# Patient Record
Sex: MALE | Race: WHITE | NOT HISPANIC OR LATINO | Employment: FULL TIME | RURAL
[De-identification: names, ages, dates, MRNs, and addresses within clinical notes are randomized per-mention and may not be internally consistent; named-entity substitution may affect disease eponyms.]

---

## 2024-02-09 ENCOUNTER — LAB VISIT (OUTPATIENT)
Dept: PRIMARY CARE CLINIC | Facility: CLINIC | Age: 44
End: 2024-02-09

## 2024-02-09 DIAGNOSIS — Z02.83 ENCOUNTER FOR DRUG SCREENING: Primary | ICD-10-CM

## 2024-02-09 PROCEDURE — 99000 SPECIMEN HANDLING OFFICE-LAB: CPT | Mod: ,,, | Performed by: NURSE PRACTITIONER

## 2024-02-09 NOTE — PROGRESS NOTES
Subjective     Patient ID: Jose Eduardo Barrientos is a 43 y.o. male.    Chief Complaint: No chief complaint on file.    HPI  Review of Systems       Objective     Physical Exam       Assessment and Plan     1. Encounter for drug screening        Drug testing only           No follow-ups on file.

## 2025-04-08 ENCOUNTER — HOSPITAL ENCOUNTER (EMERGENCY)
Facility: HOSPITAL | Age: 45
Discharge: HOME | End: 2025-04-08
Attending: FAMILY MEDICINE
Payer: OTHER MISCELLANEOUS

## 2025-04-08 VITALS
SYSTOLIC BLOOD PRESSURE: 127 MMHG | RESPIRATION RATE: 19 BRPM | WEIGHT: 181 LBS | BODY MASS INDEX: 29.09 KG/M2 | DIASTOLIC BLOOD PRESSURE: 79 MMHG | HEIGHT: 66 IN | HEART RATE: 106 BPM | OXYGEN SATURATION: 98 % | TEMPERATURE: 98 F

## 2025-04-08 DIAGNOSIS — S61.412A LACERATION OF LEFT HAND WITHOUT FOREIGN BODY, INITIAL ENCOUNTER: Primary | ICD-10-CM

## 2025-04-08 PROCEDURE — 12001 RPR S/N/AX/GEN/TRNK 2.5CM/<: CPT

## 2025-04-08 PROCEDURE — 90715 TDAP VACCINE 7 YRS/> IM: CPT | Performed by: FAMILY MEDICINE

## 2025-04-08 PROCEDURE — 90471 IMMUNIZATION ADMIN: CPT | Performed by: FAMILY MEDICINE

## 2025-04-08 PROCEDURE — 63600175 PHARM REV CODE 636 W HCPCS: Performed by: FAMILY MEDICINE

## 2025-04-08 PROCEDURE — 99284 EMERGENCY DEPT VISIT MOD MDM: CPT | Mod: 25

## 2025-04-08 RX ORDER — NAPROXEN 500 MG/1
500 TABLET ORAL 2 TIMES DAILY WITH MEALS
Qty: 60 TABLET | Refills: 0 | Status: SHIPPED | OUTPATIENT
Start: 2025-04-08

## 2025-04-08 RX ORDER — LIDOCAINE HYDROCHLORIDE 10 MG/ML
10 INJECTION, SOLUTION EPIDURAL; INFILTRATION; INTRACAUDAL; PERINEURAL
Status: COMPLETED | OUTPATIENT
Start: 2025-04-08 | End: 2025-04-08

## 2025-04-08 RX ORDER — QUETIAPINE FUMARATE 50 MG/1
50 TABLET, FILM COATED ORAL
COMMUNITY

## 2025-04-08 RX ORDER — OMEPRAZOLE 40 MG/1
40 CAPSULE, DELAYED RELEASE ORAL
COMMUNITY

## 2025-04-08 RX ORDER — MELOXICAM 7.5 MG/1
7.5 TABLET ORAL
COMMUNITY
Start: 2025-01-08

## 2025-04-08 RX ORDER — CEPHALEXIN 500 MG/1
500 CAPSULE ORAL EVERY 8 HOURS
Qty: 30 CAPSULE | Refills: 0 | Status: SHIPPED | OUTPATIENT
Start: 2025-04-08 | End: 2025-04-18

## 2025-04-08 RX ORDER — CYCLOBENZAPRINE HCL 10 MG
10 TABLET ORAL
COMMUNITY

## 2025-04-08 RX ADMIN — LIDOCAINE HYDROCHLORIDE 100 MG: 10 INJECTION, SOLUTION EPIDURAL; INFILTRATION; INTRACAUDAL; PERINEURAL at 11:04

## 2025-04-08 RX ADMIN — CLOSTRIDIUM TETANI TOXOID ANTIGEN (FORMALDEHYDE INACTIVATED), CORYNEBACTERIUM DIPHTHERIAE TOXOID ANTIGEN (FORMALDEHYDE INACTIVATED), BORDETELLA PERTUSSIS TOXOID ANTIGEN (GLUTARALDEHYDE INACTIVATED), BORDETELLA PERTUSSIS FILAMENTOUS HEMAGGLUTININ ANTIGEN (FORMALDEHYDE INACTIVATED), BORDETELLA PERTUSSIS PERTACTIN ANTIGEN, AND BORDETELLA PERTUSSIS FIMBRIAE 2/3 ANTIGEN 0.5 ML: 5; 2; 2.5; 5; 3; 5 INJECTION, SUSPENSION INTRAMUSCULAR at 11:04

## 2025-04-08 NOTE — ED PROVIDER NOTES
Encounter Date: 4/8/2025       History     Chief Complaint   Patient presents with    Hand Injury     Patient presents to the ED with c/o drill bit to the left hand. Patient states that this occurred while he was at work     Patient accidentally drilled into his left proximal dorsum of his hand with a drill bit while drilling plastic.  Full range of motion of the left hand with no sign of tendon damage.  Bleeding controlled total length of the laceration done by the drill bit 2 cm        Review of patient's allergies indicates:  No Known Allergies  History reviewed. No pertinent past medical history.  History reviewed. No pertinent surgical history.  No family history on file.  Social History[1]  Review of Systems   Constitutional:  Positive for fatigue. Negative for fever.   HENT: Negative.  Negative for sore throat.    Eyes: Negative.    Respiratory: Negative.  Negative for shortness of breath.    Cardiovascular: Negative.  Negative for chest pain.   Gastrointestinal: Negative.  Negative for nausea.   Endocrine: Negative.    Genitourinary: Negative.  Negative for dysuria.   Musculoskeletal: Negative.  Negative for back pain.        Laceration into the left proximal dorsum of his hand   Skin: Negative.  Negative for rash.   Allergic/Immunologic: Negative.    Neurological: Negative.  Negative for weakness.   Hematological: Negative.  Does not bruise/bleed easily.   Psychiatric/Behavioral: Negative.         Physical Exam     Initial Vitals [04/08/25 0942]   BP Pulse Resp Temp SpO2   127/79 106 19 98.3 °F (36.8 °C) 98 %      MAP       --         Physical Exam    Constitutional: He appears well-developed and well-nourished.   HENT:   Head: Normocephalic and atraumatic.   Right Ear: External ear normal.   Left Ear: External ear normal.   Nose: Nose normal. Mouth/Throat: Oropharynx is clear and moist.   Eyes: Conjunctivae and EOM are normal. Pupils are equal, round, and reactive to light.   Neck: Neck supple.   Normal  range of motion.  Cardiovascular:  Normal rate, regular rhythm, normal heart sounds and intact distal pulses.           Pulmonary/Chest: Breath sounds normal.   Abdominal: Abdomen is soft. Bowel sounds are normal.   Genitourinary:    Prostate and penis normal.     Musculoskeletal:         General: Normal range of motion.      Cervical back: Normal range of motion and neck supple.      Comments: No nerve damage no tendon damage wound is jagged but is easily approximated     Neurological: He is alert and oriented to person, place, and time. He has normal strength and normal reflexes.   Skin: Skin is warm and dry.   Psychiatric: He has a normal mood and affect. His behavior is normal. Judgment and thought content normal.         Medical Screening Exam   See Full Note    ED Course   Procedures  Labs Reviewed - No data to display       Imaging Results    None          Medications   LIDOcaine (PF) 10 mg/ml (1%) injection 100 mg (100 mg Infiltration Given by Provider 4/8/25 9132)   Tdap vaccine injection 0.5 mL (0.5 mLs Intramuscular Given 4/8/25 4206)     Medical Decision Making  Risk  Prescription drug management.                          Medical Decision Making:   Initial Assessment:   Patient accidentally drilled into his left proximal dorsum of his hand with a drill bit while drilling plastic.  Full range of motion of the left hand with no sign of tendon damage.  Bleeding controlled total length of the laceration done by the drill bit 2 cm        Differential Diagnosis:   2 cm laceration to the proximal 1st dorsum of the 1st index finger--  ED Management:  PROCEDURE NOTE.  THE JAGGED LACERATION OF THE DORSUM OF THE LEFT HAND WAS EASILY REPAIRED BY GOOD APPROXIMATION OF THE TISSUES PATIENT TOLERATED PROCEDURE WELL 10 CC XYLOCAINE WAS PLAYING AND 7 SIMPLE 4-0 ETHILON WAS PLACED WITH NO COMPLICATIONS.  WOUND WAS CLEANED WITH BETADINE AND NORMAL SALINE AND NO FOREIGN BODY WAS VISUALIZED OR WASHED OUT.  THE AREA WAS ALSO  OBSERVED AND UNDER EXPLORATION FOUND TO HAVE NO TENDON INVOLVEMENT AND NO BONE INVOLVED PATIENT WILL FULL RANGE OF MOTION IN NO NEUROLOGIC DEFICIT             Clinical Impression:   Final diagnoses:  [S68.519C] Laceration of left hand without foreign body, initial encounter (Primary)        ED Disposition Condition    Discharge Stable          ED Prescriptions       Medication Sig Dispense Start Date End Date Auth. Provider    cephALEXin (KEFLEX) 500 MG capsule Take 1 capsule (500 mg total) by mouth every 8 (eight) hours. for 10 days 30 capsule 4/8/2025 4/18/2025 Jose Eduardo Guzman DO    naproxen (NAPROSYN) 500 MG tablet Take 1 tablet (500 mg total) by mouth 2 (two) times daily with meals. 60 tablet 4/8/2025 -- Jose Eduardo Guzman DO          Follow-up Information    None              [1]   Social History  Tobacco Use    Smoking status: Never    Smokeless tobacco: Never        Jose Eduardo Guzman DO  04/08/25 1939

## 2025-04-21 ENCOUNTER — OFFICE VISIT (OUTPATIENT)
Dept: PRIMARY CARE CLINIC | Facility: CLINIC | Age: 45
End: 2025-04-21
Payer: OTHER MISCELLANEOUS

## 2025-04-21 VITALS
SYSTOLIC BLOOD PRESSURE: 135 MMHG | TEMPERATURE: 98 F | HEIGHT: 66 IN | DIASTOLIC BLOOD PRESSURE: 86 MMHG | WEIGHT: 181 LBS | BODY MASS INDEX: 29.09 KG/M2 | HEART RATE: 100 BPM | RESPIRATION RATE: 20 BRPM | OXYGEN SATURATION: 98 %

## 2025-04-21 DIAGNOSIS — S65.312A LACERATION OF DEEP PALMAR ARCH OF LEFT HAND, INITIAL ENCOUNTER: Primary | ICD-10-CM

## 2025-04-21 DIAGNOSIS — Z51.89 VISIT FOR WOUND CHECK: ICD-10-CM

## 2025-04-21 PROCEDURE — 99203 OFFICE O/P NEW LOW 30 MIN: CPT | Mod: ,,, | Performed by: NURSE PRACTITIONER

## 2025-04-21 NOTE — PROGRESS NOTES
Subjective     Patient ID: Jose Eduardo Barrientos is a 45 y.o. male.    Chief Complaint: Work Related Injury (Presents with work related injury laceration to left hand 04/08/25 here for suture removal)    44 y/o wm presents for wound check and suture removal. He got sutures to his left hand in the ED on 4/8/25. No s/s of infection, no redness, swelling or drainage.       Review of Systems   Integumentary:  Positive for wound.   All other systems reviewed and are negative.         Objective     Physical Exam  Vitals and nursing note reviewed.   Constitutional:       Appearance: Normal appearance.   HENT:      Head: Normocephalic.   Eyes:      Pupils: Pupils are equal, round, and reactive to light.   Cardiovascular:      Rate and Rhythm: Normal rate and regular rhythm.      Heart sounds: Normal heart sounds.   Pulmonary:      Effort: Pulmonary effort is normal.      Breath sounds: Normal breath sounds.   Musculoskeletal:         General: Signs of injury present. No swelling or tenderness. Normal range of motion.      Cervical back: Normal range of motion.   Skin:     General: Skin is warm and dry.   Neurological:      General: No focal deficit present.      Mental Status: He is alert and oriented to person, place, and time.   Psychiatric:         Attention and Perception: Attention and perception normal.         Mood and Affect: Mood normal.         Speech: Speech normal.         Behavior: Behavior normal. Behavior is cooperative.         Cognition and Memory: Cognition normal.         Judgment: Judgment normal.     Sutures removed by Janna dunne LPN without difficulty.        Assessment and Plan     1. Laceration of deep palmar arch of left hand, initial encounter    2. Visit for wound check        RTW full duty. RTN to [facility] PRN         No follow-ups on file.  Patient ID: Jose Eduardo Barrientos is a 45 y.o. male.    Chief Complaint: Work Related Injury (Presents with work related injury laceration to left hand 04/08/25 here for  suture removal)    History of Present Illness              Physical Exam              Assessment & Plan               1. Laceration of deep palmar arch of left hand, initial encounter    2. Visit for wound check        No follow-ups on file.    This note was generated with the assistance of ambient listening technology. Verbal consent was obtained by the patient and accompanying visitor(s) for the recording of patient appointment to facilitate this note. I attest to having reviewed and edited the generated note for accuracy, though some syntax or spelling errors may persist. Please contact the author of this note for any clarification.